# Patient Record
Sex: MALE | Race: BLACK OR AFRICAN AMERICAN | NOT HISPANIC OR LATINO | URBAN - NONMETROPOLITAN AREA
[De-identification: names, ages, dates, MRNs, and addresses within clinical notes are randomized per-mention and may not be internally consistent; named-entity substitution may affect disease eponyms.]

---

## 2019-05-17 ENCOUNTER — IMPORTED ENCOUNTER (OUTPATIENT)
Dept: URBAN - NONMETROPOLITAN AREA CLINIC 1 | Facility: CLINIC | Age: 74
End: 2019-05-17

## 2019-05-17 PROBLEM — H25.813: Noted: 2019-05-17

## 2019-05-17 PROCEDURE — 92004 COMPRE OPH EXAM NEW PT 1/>: CPT

## 2019-05-17 NOTE — PATIENT DISCUSSION
Cataract(s)-Visually significant cataract OU.-Cataract(s) causing symptomatic impairment of visual function not correctable with a tolerable change in glasses or contact lenses lighting or non-operative means resulting in specific activity limitations and/or participation restrictions including but not limited to reading viewing television driving or meeting vocational or recreational needs. -Expectation is clearer vision and functional improvement in symptoms as well as reduced glare disability after cataract removal.-Order IOLMaster and OPD today. -Recommend Standard/Traditional based on today's OPD testing and lifestyle questionnaire.-All questions were answered regarding surgery including pre and post-op medications appointments activity restrictions and anesthetic usage.-The risks benefits and alternatives and special risk factors for the patient were discussed in detail including but not limited to: bleeding infection retinal detachment vitreous loss problems with the implant and possible need for additional surgery.-Although rare the possibility of complete vision loss was discussed.-The possible need for glasses post-operatively was discussed.-Order medical clearance exam based on history of diabetes-Patient elects to proceed with cataract surgery OD . Will schedule at patient's convenience and re-evaluate OS  in the future.

## 2019-06-25 ENCOUNTER — IMPORTED ENCOUNTER (OUTPATIENT)
Dept: URBAN - NONMETROPOLITAN AREA CLINIC 1 | Facility: CLINIC | Age: 74
End: 2019-06-25

## 2019-06-25 PROBLEM — H25.813: Noted: 2019-06-25

## 2019-07-02 ENCOUNTER — IMPORTED ENCOUNTER (OUTPATIENT)
Dept: URBAN - NONMETROPOLITAN AREA CLINIC 1 | Facility: CLINIC | Age: 74
End: 2019-07-02

## 2019-07-02 PROBLEM — E11.9: Noted: 2019-07-02

## 2019-07-02 PROBLEM — R03.0: Noted: 2019-07-02

## 2019-07-02 PROBLEM — Z01.818: Noted: 2019-07-02

## 2019-07-02 PROBLEM — I10: Noted: 2019-07-02

## 2019-07-02 PROBLEM — E78.5: Noted: 2019-07-02

## 2019-07-30 ENCOUNTER — IMPORTED ENCOUNTER (OUTPATIENT)
Dept: URBAN - NONMETROPOLITAN AREA CLINIC 1 | Facility: CLINIC | Age: 74
End: 2019-07-30

## 2019-07-30 PROBLEM — Z98.41: Noted: 2019-07-30

## 2019-07-30 PROBLEM — H25.812: Noted: 2019-07-30

## 2019-07-30 PROBLEM — E11.9: Noted: 2019-07-30

## 2019-07-30 PROCEDURE — 92012 INTRM OPH EXAM EST PATIENT: CPT

## 2019-07-30 PROCEDURE — 99024 POSTOP FOLLOW-UP VISIT: CPT

## 2019-07-30 NOTE — PATIENT DISCUSSION
Cataract OS-Visually significant cataract OS. -Cataract causing symptomatic impairment of visual function not correctable with a tolerable change in glasses or contact lenses lighting or non-operative means resulting in specific activity limitations and/or participation restrictions including but not limited to reading viewing television driving or meeting vocational or recreational needs. -Expectation is clearer vision and functional improvement in symptoms as well as reduced glare disability after cataract removal.-Recommend standard/traditional based on previous OPD testing and lifestyle questionnaire.-All questions were answered regarding surgery including pre and post-op medications appointments activity restrictions and anesthetic usage.-The risks benefits and alternatives and special risk factors for the patient were discussed in detail including but not limited to: bleeding infection retinal detachment vitreous loss problems with the implant and possible need for additional surgery.-Although rare the possibility of complete vision loss was discussed.-The need for glasses post-operatively was discussed.-Patient elects to proceed with cataract surgery OS. Will schedule at patient's convenience. 1 week s/p PCIOL OD-Pt doing well at 1 week s/p PCIOL OD.-Continue post-op gtts according to instruction sheet.-Okay to resume usual activites and d/c eye shield.

## 2019-08-01 ENCOUNTER — IMPORTED ENCOUNTER (OUTPATIENT)
Dept: URBAN - NONMETROPOLITAN AREA CLINIC 1 | Facility: CLINIC | Age: 74
End: 2019-08-01

## 2019-08-01 PROCEDURE — 66984 XCAPSL CTRC RMVL W/O ECP: CPT

## 2019-08-01 PROCEDURE — 92136 OPHTHALMIC BIOMETRY: CPT

## 2019-10-07 NOTE — PATIENT DISCUSSION
The patient feels that the cataract is significantly impacting daily activities and has elected cataract surgery. The risks, benefits, and alternatives to surgery were discussed. The patient elects to proceed with surgery.  patient elects PCIOL OD BASIC GOAL OVI.

## 2019-10-24 NOTE — PROCEDURE NOTE: SURGICAL
MR #: [id number]<DU /><br />PREOPERATIVE DIAGNOSIS: Cataract, right eye.<br /><br />POSTOPERATIVE DIAGNOSIS: Same.<br /><br />OPERATIVE PROCEDURE: Phacoemulsification with +22.5 diopter Jerson &amp; Jerson , PC-IOL, right eye.<br /><br />PROCEDURE:&nbsp; Following sedation, Haley Mo CRNA, administered topical anesthesia in the form of lidocaine 2% gel as per the anesthetic record. <br /><br />Prior to commencing surgery patient identification, surgical procedure, site, and side were confirmed by Dr. Ferraro.&nbsp; The patient was then prepped with Betadine and draped with sterile drapes. A lid speculum was placed and the side port incision prepared. &nbsp; 0.5 cc of Epi-Shugarcaine was instilled and the anterior chamber entered at the temporal 180&deg; limbus in a single plane fashion employing a 2.7 mm trapezoid sami and ring fixation. Viscoelastic was placed, a circular capsulorhexis performed, hydrodissection accomplished and the nucleus emulsified within the posterior chamber. Cortex was aspirated with the I/A handpiece, the posterior capsule polished and viscoelastic instilled to distend the capsular sac. A +22.5 diopter Jerson [employer]amp; Valderr[employer]  was placed into the bag under direct visualization without difficulty employing the microinjector. Viscoelastic was aspirated with the I/A handpiece and the anterior chamber pressurized with BSS. The incision was tested for leaks and none were found. A single injection of 0.2 cc of Moxifloxacin was placed in the anterior chamber. The patient returned to the holding area having tolerated the procedure extremely well and without complication. <br /><br />Epi-shugarcaine consists of a mixture of 1cc epinephrine MPF 1:1000, 0.75 cc 4% lidocaine MPF and 2.25 cc BSS. Moxifloxacin consists of a mixture of Vigamox and BSS 1 mg/1 ml solution. <br /><br /><br />

## 2021-05-03 NOTE — PATIENT DISCUSSION
Pt educated on possible causes of condition.  Retinal and optic nerve eval were normal today.  Recommend pt see PCP for Carotid ultrasound to rule out blockage.  Send letter to Dr. Gwendolyn Suarez today.

## 2022-04-09 ASSESSMENT — TONOMETRY
OS_IOP_MMHG: 22
OD_IOP_MMHG: 16
OD_IOP_MMHG: 12
OS_IOP_MMHG: 14

## 2022-04-09 ASSESSMENT — VISUAL ACUITY
OD_PH: 20/25-2
OD_PAM: 20/25
OD_PH: 20/40-
OS_GLARE: 20/60
OD_CC: 20/50-
OD_GLARE: 20/70
OS_AM: 20/25
OS_GLARE: 20/60
OS_SC: 20/40
OS_AM: 20/25
OD_SC: 20/50
OS_PH: 20/30
OD_SC: 20/50
OS_SC: 20/40
OS_PH: 20/30
OD_PAM: 20/25
OD_GLARE: 20/70
OD_PH: 20/25

## 2022-05-09 NOTE — PATIENT DISCUSSION
Saw Dr. Rosalina Hernandez and had MRI, carotid scan, cardio workup and everything was fine, but BP was elevated.